# Patient Record
Sex: MALE | Race: WHITE | URBAN - METROPOLITAN AREA
[De-identification: names, ages, dates, MRNs, and addresses within clinical notes are randomized per-mention and may not be internally consistent; named-entity substitution may affect disease eponyms.]

---

## 2021-12-27 ENCOUNTER — E-VISIT (OUTPATIENT)
Dept: URGENT CARE | Facility: URGENT CARE | Age: 61
End: 2021-12-27

## 2021-12-27 DIAGNOSIS — R07.0 THROAT PAIN: Primary | ICD-10-CM

## 2021-12-27 DIAGNOSIS — B97.89 VIRAL SINUSITIS: ICD-10-CM

## 2021-12-27 DIAGNOSIS — J32.9 VIRAL SINUSITIS: ICD-10-CM

## 2021-12-27 PROCEDURE — 99421 OL DIG E/M SVC 5-10 MIN: CPT | Performed by: PHYSICIAN ASSISTANT

## 2021-12-27 NOTE — TELEPHONE ENCOUNTER
Provider E-Visit time total (minutes): 4   SUBJECTIVE: JONG overnight. Tolerating trach collar without difficulty.       MEDICATIONS  (STANDING):  bromocriptine Tablet 5 milliGRAM(s) Oral <User Schedule>  cefepime   IVPB 2000 milliGRAM(s) IV Intermittent every 8 hours  chlorhexidine 0.12% Liquid 15 milliLiter(s) Swish and Spit two times a day  chlorhexidine 2% Cloths 1 Application(s) Topical daily  enoxaparin Injectable 40 milliGRAM(s) SubCutaneous daily  folic acid 1 milliGRAM(s) Oral daily  lactulose Syrup 20 Gram(s) Oral every 12 hours  levETIRAcetam  Solution 1000 milliGRAM(s) Oral two times a day  melatonin 5 milliGRAM(s) Oral at bedtime  oxyCODONE    Solution 10 milliGRAM(s) Oral every 4 hours  phytonadione   Solution 5 milliGRAM(s) Oral daily  propranolol 20 milliGRAM(s) Oral every 6 hours  rifaximin 550 milliGRAM(s) Oral two times a day  spironolactone 25 milliGRAM(s) Oral daily  thiamine 100 milliGRAM(s) Oral daily    MEDICATIONS  (PRN):  acetaminophen    Suspension .. 650 milliGRAM(s) Oral every 6 hours PRN Temp greater or equal to 38.5C (101.3F)      Vital Signs Last 24 Hrs  T(C): 37.4 (07 Nov 2018 12:00), Max: 37.6 (06 Nov 2018 16:00)  T(F): 99.3 (07 Nov 2018 12:00), Max: 99.7 (06 Nov 2018 16:00)  HR: 82 (07 Nov 2018 13:00) (73 - 87)  BP: --  BP(mean): --  RR: 21 (07 Nov 2018 13:00) (16 - 32)  SpO2: 97% (07 Nov 2018 13:00) (94% - 100%)    PE  Constitutional: NAD  Neck: No JVD  Respiratory: Breath Sounds equal & clear to percussion & auscultation, no accessory muscle use, on PS  Cardiovascular: Regular rate & rhythm, normal S1, S2  Gastrointestinal: Soft, non-tender, non-distended, PEG tube in place  Extremities: No peripheral edema, No cyanosis, clubbing   Vascular: Equal and normal pulses: 2+ peripheral pulses throughout      I&O's Detail    06 Nov 2018 07:01 - 07 Nov 2018 07:00  --------------------------------------------------------  IN:    Enteral Tube Flush: 680 mL    Pivot: 1150 mL    Solution: 150 mL    Solution: 400 mL  Total IN: 2380 mL    OUT:    Indwelling Catheter - Urethral: 1030 mL  Total OUT: 1030 mL    Total NET: 1350 mL      07 Nov 2018 07:01  -  07 Nov 2018 14:39  --------------------------------------------------------  IN:    Enteral Tube Flush: 70 mL    Pivot: 350 mL    Solution: 50 mL    Solution: 50 mL  Total IN: 520 mL    OUT:    Indwelling Catheter - Urethral: 365 mL  Total OUT: 365 mL    Total NET: 155 mL          LABS:                        7.4    14.0  )-----------( 292      ( 07 Nov 2018 04:54 )             23.2     11-07    130<L>  |  90<L>  |  12.0  ----------------------------<  155<H>  4.0   |  26.0  |  0.23<L>    Ca    8.2<L>      07 Nov 2018 04:54  Phos  2.4     11-07  Mg     1.5     11-07            RADIOLOGY & ADDITIONAL STUDIES:

## 2022-02-06 ENCOUNTER — HEALTH MAINTENANCE LETTER (OUTPATIENT)
Age: 62
End: 2022-02-06

## 2022-10-03 ENCOUNTER — HEALTH MAINTENANCE LETTER (OUTPATIENT)
Age: 62
End: 2022-10-03

## 2023-02-12 ENCOUNTER — HEALTH MAINTENANCE LETTER (OUTPATIENT)
Age: 63
End: 2023-02-12

## 2024-03-09 ENCOUNTER — HEALTH MAINTENANCE LETTER (OUTPATIENT)
Age: 64
End: 2024-03-09